# Patient Record
Sex: MALE | Race: WHITE | HISPANIC OR LATINO | Employment: PART TIME | URBAN - METROPOLITAN AREA
[De-identification: names, ages, dates, MRNs, and addresses within clinical notes are randomized per-mention and may not be internally consistent; named-entity substitution may affect disease eponyms.]

---

## 2017-07-09 ENCOUNTER — HOSPITAL ENCOUNTER (EMERGENCY)
Facility: HOSPITAL | Age: 22
Discharge: HOME | End: 2017-07-09
Admitting: EMERGENCY MEDICINE
Payer: COMMERCIAL

## 2017-07-09 VITALS
HEART RATE: 57 BPM | RESPIRATION RATE: 18 BRPM | BODY MASS INDEX: 20.58 KG/M2 | OXYGEN SATURATION: 99 % | TEMPERATURE: 98 F | HEIGHT: 71 IN | DIASTOLIC BLOOD PRESSURE: 72 MMHG | SYSTOLIC BLOOD PRESSURE: 124 MMHG | WEIGHT: 147 LBS

## 2017-07-09 DIAGNOSIS — B96.89 LOCALIZED BACTERIAL INFECTION OF SKIN: Primary | ICD-10-CM

## 2017-07-09 DIAGNOSIS — L08.9 LOCALIZED BACTERIAL INFECTION OF SKIN: Primary | ICD-10-CM

## 2017-07-09 PROCEDURE — 36415 COLL VENOUS BLD VENIPUNCTURE: CPT | Performed by: PHYSICIAN ASSISTANT

## 2017-07-09 PROCEDURE — 99283 EMERGENCY DEPT VISIT LOW MDM: CPT

## 2017-07-09 PROCEDURE — 86618 LYME DISEASE ANTIBODY: CPT | Performed by: PHYSICIAN ASSISTANT

## 2017-07-09 RX ORDER — CLINDAMYCIN HYDROCHLORIDE 150 MG/1
CAPSULE ORAL
Qty: 42 CAPSULE | Refills: 0 | Status: SHIPPED | OUTPATIENT
Start: 2017-07-09 | End: 2017-07-10

## 2017-07-10 ENCOUNTER — HOSPITAL ENCOUNTER (EMERGENCY)
Facility: HOSPITAL | Age: 22
Discharge: HOME | End: 2017-07-10
Attending: EMERGENCY MEDICINE | Admitting: EMERGENCY MEDICINE
Payer: COMMERCIAL

## 2017-07-10 VITALS
DIASTOLIC BLOOD PRESSURE: 67 MMHG | HEART RATE: 69 BPM | TEMPERATURE: 97.9 F | WEIGHT: 146.61 LBS | OXYGEN SATURATION: 100 % | SYSTOLIC BLOOD PRESSURE: 135 MMHG | BODY MASS INDEX: 20.45 KG/M2 | RESPIRATION RATE: 16 BRPM

## 2017-07-10 DIAGNOSIS — L03.115 CELLULITIS OF RIGHT LOWER EXTREMITY: Primary | ICD-10-CM

## 2017-07-10 LAB
B BURGDOR IGG SER IA-ACNC: 0.34
B BURGDOR IGM SER IA-ACNC: 0.34

## 2017-07-10 PROCEDURE — 99282 EMERGENCY DEPT VISIT SF MDM: CPT

## 2017-07-10 RX ORDER — CLINDAMYCIN PHOSPHATE 600 MG/50ML
600 INJECTION INTRAVENOUS ONCE
Status: COMPLETED | OUTPATIENT
Start: 2017-07-10 | End: 2017-07-10

## 2017-07-10 RX ORDER — CLINDAMYCIN HYDROCHLORIDE 300 MG/1
300 CAPSULE ORAL 4 TIMES DAILY
Qty: 40 CAPSULE | Refills: 0 | Status: SHIPPED | OUTPATIENT
Start: 2017-07-10 | End: 2017-07-20

## 2017-07-10 RX ADMIN — CLINDAMYCIN PHOSPHATE 600 MG: 12 INJECTION, SOLUTION INTRAMUSCULAR; INTRAVENOUS at 12:15

## 2020-08-29 ENCOUNTER — HOSPITAL ENCOUNTER (EMERGENCY)
Facility: HOSPITAL | Age: 25
Discharge: HOME | End: 2020-08-29
Attending: EMERGENCY MEDICINE
Payer: COMMERCIAL

## 2020-08-29 VITALS
HEART RATE: 84 BPM | RESPIRATION RATE: 16 BRPM | DIASTOLIC BLOOD PRESSURE: 94 MMHG | TEMPERATURE: 97.2 F | SYSTOLIC BLOOD PRESSURE: 143 MMHG | OXYGEN SATURATION: 99 %

## 2020-08-29 DIAGNOSIS — S05.01XA ABRASION OF RIGHT CORNEA, INITIAL ENCOUNTER: Primary | ICD-10-CM

## 2020-08-29 PROCEDURE — 63700000 HC SELF-ADMINISTRABLE DRUG: Performed by: PHYSICIAN ASSISTANT

## 2020-08-29 PROCEDURE — 99282 EMERGENCY DEPT VISIT SF MDM: CPT

## 2020-08-29 RX ORDER — PROPARACAINE HYDROCHLORIDE 5 MG/ML
1 SOLUTION/ DROPS OPHTHALMIC ONCE
Status: COMPLETED | OUTPATIENT
Start: 2020-08-29 | End: 2020-08-29

## 2020-08-29 RX ORDER — OFLOXACIN 3 MG/ML
2 SOLUTION/ DROPS OPHTHALMIC EVERY 6 HOURS
Qty: 2.8 ML | Refills: 0 | Status: SHIPPED | OUTPATIENT
Start: 2020-08-29 | End: 2020-09-05

## 2020-08-29 RX ADMIN — PROPARACAINE HYDROCHLORIDE 1 DROP: 5 SOLUTION/ DROPS OPHTHALMIC at 14:19

## 2020-08-29 ASSESSMENT — ENCOUNTER SYMPTOMS
EYE DISCHARGE: 0
EYE PAIN: 1
PHOTOPHOBIA: 1
EYE REDNESS: 1
BLURRED VISION: 0
COUGH: 0
FEVER: 0
DOUBLE VISION: 0

## 2020-08-29 NOTE — ED PROVIDER NOTES
HPI     Chief Complaint   Patient presents with   • Eye Problem         History provided by:  Patient  Eye Problem   Location:  Right eye  Duration: yesterday morning.  Chronicity:  New  Context comment:  Pt reports was scratched in right eye by his kitten  Relieved by:  Darkened room and closing eye  Associated symptoms: photophobia and redness    Associated symptoms: no blurred vision, no decreased vision, no discharge and no double vision    Risk factors comment:  Pt is a contact lens wearer        Patient History     History reviewed. No pertinent past medical history.    History reviewed. No pertinent surgical history.    History reviewed. No pertinent family history.    Social History     Tobacco Use   • Smoking status: Never Smoker   • Smokeless tobacco: Never Used   Substance Use Topics   • Alcohol use: Yes     Comment: rare   • Drug use: Yes     Types: Marijuana       Systems Reviewed from Nursing Triage:          Review of Systems     Review of Systems   Constitutional: Negative for fever.   HENT: Negative for congestion.    Eyes: Positive for photophobia, pain and redness. Negative for blurred vision, double vision, discharge and visual disturbance.   Respiratory: Negative for cough.         Physical Exam     ED Triage Vitals [08/29/20 1335]   Temp Heart Rate Resp BP SpO2   36.2 °C (97.2 °F) 84 16 (!) 143/94 99 %      Temp Source Heart Rate Source Patient Position BP Location FiO2 (%) (Set)   Tympanic Monitor Sitting Right upper arm --                     Patient Vitals for the past 24 hrs:   BP Temp Temp src Pulse Resp SpO2   08/29/20 1335 (!) 143/94 36.2 °C (97.2 °F) Tympanic 84 16 99 %                                          Physical Exam   Constitutional: He is oriented to person, place, and time. He appears well-developed and well-nourished. No distress.   Eyes: Pupils are equal, round, and reactive to light. EOM are normal. Right eye exhibits no discharge. No foreign body present in the right eye.  Right conjunctiva is injected (moderate).   Slit lamp exam:       The right eye shows corneal abrasion (small uptake to central cornea ) and fluorescein uptake. The right eye shows no foreign body, no hyphema and no hypopyon.   See RN chart for visual acuity   Neck: Normal range of motion. Neck supple.   Pulmonary/Chest: Effort normal.   Neurological: He is alert and oriented to person, place, and time.   Nursing note and vitals reviewed.           Procedures    Labs Reviewed - No data to display    No orders to display               ED Course & MDM     MDM         Clinical Impressions as of Aug 29 1414   Abrasion of right cornea, initial encounter        Denise Sneed PA C  08/29/20 1414

## 2020-08-29 NOTE — ED ATTESTATION NOTE
I have personally seen, evaluated,and participated in the management of Dillon Carvlaho.  I reviewed and agree with the PA/NP's assessment and plan of care with the following exceptions: None    My examination, assessment, and plan of care for Dillon Carvalho:  25-year-old male presented with right eye tearing and pain.  Was scratching his right eye by his kitten yesterday without any immediate drainage noticeable.  Patient wears contact lens  Exam:   Vitals:    08/29/20 1335   BP: (!) 143/94   Pulse: 84   Resp: 16   Temp: 36.2 °C (97.2 °F)   SpO2: 99%   Small dye uptake in the central part of his right cornea.  Normal round pupil.  No drainage.    Plan:  Slit-lamp antibiotics     Gigi Lui MD  08/29/20 8340

## 2020-08-29 NOTE — DISCHARGE INSTRUCTIONS
Use Ibuprofen every 6 hours for pain  NO contacts until cleared by the ophthalmologist  Return for loss of vision, fever or any other concerning symptoms